# Patient Record
Sex: FEMALE
[De-identification: names, ages, dates, MRNs, and addresses within clinical notes are randomized per-mention and may not be internally consistent; named-entity substitution may affect disease eponyms.]

---

## 2018-02-05 ENCOUNTER — TRANSCRIPTION ENCOUNTER (OUTPATIENT)
Age: 28
End: 2018-02-05

## 2020-06-18 PROBLEM — Z00.00 ENCOUNTER FOR PREVENTIVE HEALTH EXAMINATION: Status: ACTIVE | Noted: 2020-06-18

## 2020-06-23 ENCOUNTER — APPOINTMENT (OUTPATIENT)
Dept: ORTHOPEDIC SURGERY | Facility: CLINIC | Age: 30
End: 2020-06-23
Payer: MEDICAID

## 2020-06-23 VITALS
HEIGHT: 64 IN | WEIGHT: 135 LBS | BODY MASS INDEX: 23.05 KG/M2 | SYSTOLIC BLOOD PRESSURE: 110 MMHG | OXYGEN SATURATION: 98 % | DIASTOLIC BLOOD PRESSURE: 60 MMHG | HEART RATE: 60 BPM

## 2020-06-23 DIAGNOSIS — Z78.9 OTHER SPECIFIED HEALTH STATUS: ICD-10-CM

## 2020-06-23 DIAGNOSIS — Z80.3 FAMILY HISTORY OF MALIGNANT NEOPLASM OF BREAST: ICD-10-CM

## 2020-06-23 PROCEDURE — 99204 OFFICE O/P NEW MOD 45 MIN: CPT

## 2020-06-23 NOTE — PHYSICAL EXAM
[de-identified] : General appearance: well nourished and hydrated, pleasant, alert and oriented x 3, cooperative.  \par HEENT: normocephalic, EOM intact, wearing mask, external auditory canal clear.  \par Cardiovascular: no lower leg edema, no varicosities, dorsalis pedis pulses palpable and symmetric.  \par Lymphatics: no palpable lymphadenopathy, no lymphedema.  \par Neurologic: sensation is normal, no muscle weakness in upper or lower extremities, patella tendon reflexes present and symmetric.  \par Dermatologic: skin moist, warm, no rash.  \par Spine: cervical spine with normal lordosis and painless range of motion, thoracic spine with normal kyphosis and painless range of motion, lumbosacral spine with normal lordosis and painless range of motion.\par Gait: hobbling with left knee semiflexed.\par \par Left knee:\par - Inspection: moderate effusion and anterior soft tissue swelling, negative ecchymosis and erythema.  \par - Wounds: healed abrasions at anterolateral aspect of patella; healed longitudinal scar across skin overlying patellar tendon\par - Alignment: normal.  \par - Palpation: tenderness on palpation of lateral joint line, patellar tendon, and fat pad.  \par - ROM active: 30-80, pain on extremes of motion\par - Ligamentous laxity: limited by stiffness and guarding. Grossly negative Lachman; unable to assess drawers; stable to varus stress, stable to valgus stress.  \par - Meniscal Test: painful Thomas and Jeniffer.  \par - Muscle Test: 5/5 quad strength.  \par \par Right knee:\par - Inspection: negative swelling, ecchymosis, and erythema.  \par - Wounds: none.  \par - Alignment: normal.  \par - Palpation: no specific tenderness on palpation.  \par - ROM active: 10 hyper - 150, no pain on extremes of motion\par - Ligamentous laxity: negative Lachman, negative ant. drawer test, negative post. drawer test, negative pivot shift test, stable to varus stress, stable to valgus stress.  \par - Meniscal Test: negative Thomas, negative Jeniffer.  \par - Popliteal angle: 80 degrees\par - Muscle Test: 5/5 quad strength. [de-identified] : Outside x-rays were reviewed, dated 5/30/20 (uploaded to [facility]): no fracture, dislocation, osteoarthritis, or osteonecrosis noted of left knee. Patella sits at appropriate height and tracks centrally.
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
## 2020-06-23 NOTE — REVIEW OF SYSTEMS
[Joint Swelling] : joint swelling [Joint Pain] : joint pain [Joint Stiffness] : joint stiffness [Negative] : Heme/Lymph

## 2020-06-29 ENCOUNTER — APPOINTMENT (OUTPATIENT)
Dept: RADIOLOGY | Facility: CLINIC | Age: 30
End: 2020-06-29

## 2020-06-29 ENCOUNTER — OUTPATIENT (OUTPATIENT)
Dept: OUTPATIENT SERVICES | Facility: HOSPITAL | Age: 30
LOS: 1 days | End: 2020-06-29
Payer: COMMERCIAL

## 2020-06-29 ENCOUNTER — RESULT REVIEW (OUTPATIENT)
Age: 30
End: 2020-06-29

## 2020-06-29 ENCOUNTER — APPOINTMENT (OUTPATIENT)
Dept: ORTHOPEDIC SURGERY | Facility: CLINIC | Age: 30
End: 2020-06-29
Payer: MEDICAID

## 2020-06-29 VITALS — BODY MASS INDEX: 23.05 KG/M2 | RESPIRATION RATE: 16 BRPM | WEIGHT: 135 LBS | HEIGHT: 64 IN

## 2020-06-29 PROCEDURE — 99214 OFFICE O/P EST MOD 30 MIN: CPT

## 2020-06-29 PROCEDURE — 72084 X-RAY EXAM ENTIRE SPI 6/> VW: CPT

## 2020-06-29 PROCEDURE — 72020 X-RAY EXAM OF SPINE 1 VIEW: CPT | Mod: 26

## 2020-07-01 ENCOUNTER — APPOINTMENT (OUTPATIENT)
Dept: NEUROLOGY | Facility: CLINIC | Age: 30
End: 2020-07-01
Payer: MEDICAID

## 2020-07-01 VITALS
BODY MASS INDEX: 22.71 KG/M2 | WEIGHT: 133 LBS | TEMPERATURE: 97.3 F | OXYGEN SATURATION: 97 % | HEART RATE: 86 BPM | SYSTOLIC BLOOD PRESSURE: 112 MMHG | HEIGHT: 64 IN | DIASTOLIC BLOOD PRESSURE: 70 MMHG

## 2020-07-01 PROCEDURE — 99243 OFF/OP CNSLTJ NEW/EST LOW 30: CPT

## 2020-07-02 LAB — CERULOPLASMIN SERPL-MCNC: 29 MG/DL

## 2020-07-08 LAB — COPPER SERPL-MCNC: 121 UG/DL

## 2020-07-14 ENCOUNTER — OUTPATIENT (OUTPATIENT)
Dept: OUTPATIENT SERVICES | Facility: HOSPITAL | Age: 30
LOS: 1 days | End: 2020-07-14

## 2020-07-14 ENCOUNTER — APPOINTMENT (OUTPATIENT)
Dept: MRI IMAGING | Facility: CLINIC | Age: 30
End: 2020-07-14
Payer: MEDICAID

## 2020-07-14 PROCEDURE — 70553 MRI BRAIN STEM W/O & W/DYE: CPT | Mod: 26

## 2020-07-27 ENCOUNTER — APPOINTMENT (OUTPATIENT)
Dept: ORTHOPEDIC SURGERY | Facility: CLINIC | Age: 30
End: 2020-07-27
Payer: MEDICAID

## 2020-07-27 VITALS
WEIGHT: 133 LBS | HEIGHT: 64 IN | SYSTOLIC BLOOD PRESSURE: 102 MMHG | BODY MASS INDEX: 22.71 KG/M2 | OXYGEN SATURATION: 96 % | HEART RATE: 88 BPM | DIASTOLIC BLOOD PRESSURE: 71 MMHG

## 2020-07-27 DIAGNOSIS — M62.838 OTHER MUSCLE SPASM: ICD-10-CM

## 2020-07-27 DIAGNOSIS — M24.562 CONTRACTURE, LEFT KNEE: ICD-10-CM

## 2020-07-27 PROCEDURE — 99213 OFFICE O/P EST LOW 20 MIN: CPT

## 2020-08-24 ENCOUNTER — APPOINTMENT (OUTPATIENT)
Dept: NEUROLOGY | Facility: CLINIC | Age: 30
End: 2020-08-24
Payer: MEDICAID

## 2020-08-24 VITALS
TEMPERATURE: 98.2 F | BODY MASS INDEX: 21.85 KG/M2 | OXYGEN SATURATION: 98 % | DIASTOLIC BLOOD PRESSURE: 80 MMHG | HEART RATE: 67 BPM | SYSTOLIC BLOOD PRESSURE: 115 MMHG | HEIGHT: 64 IN | WEIGHT: 128 LBS

## 2020-08-24 PROCEDURE — 99213 OFFICE O/P EST LOW 20 MIN: CPT

## 2020-08-24 RX ORDER — FLUTICASONE PROPIONATE 50 UG/1
50 SPRAY, METERED NASAL
Qty: 16 | Refills: 0 | Status: DISCONTINUED | COMMUNITY
Start: 2020-02-18 | End: 2020-08-24

## 2020-08-24 RX ORDER — PREDNISONE 20 MG/1
20 TABLET ORAL
Qty: 5 | Refills: 0 | Status: DISCONTINUED | COMMUNITY
Start: 2020-02-27 | End: 2020-08-24

## 2020-08-24 RX ORDER — AMOXICILLIN AND CLAVULANATE POTASSIUM 875; 125 MG/1; MG/1
875-125 TABLET, COATED ORAL
Qty: 14 | Refills: 0 | Status: DISCONTINUED | COMMUNITY
Start: 2020-02-18 | End: 2020-08-24

## 2020-08-24 NOTE — ASSESSMENT
[FreeTextEntry1] : There does not appear to be a direct neurologic cause of knee stiffness. She is diffusely hyperreflexic, but this is symmetric, and there is no clonus at the ankles. There is no weakness or sensory loss. \par \par The tremor since childhood is interesting and may be related in a way - perhaps hyperexcitability of central nervous system motor circuits may be causing some subconscious / involuntary stiffening of the leg after injury. \par Will test serum copper and ceruloplasmin (for Coy's disease) and check MRI Brain for that as well as other potential etiologies. \par If unremarkable, she may have a form of essential tremor (although no family history), or perhaps tremor related to anxiety (although would be unusual to be present since childhood through this age). \par \par There is no allodynia, warmth, erythema or edema to suggest CRPS. Thus there is no neurologic contraindication to knee surgery, regardless of the etiology of tremor.

## 2020-08-24 NOTE — ASSESSMENT
[FreeTextEntry1] : Left knee / leg stiffness\par Tremor consistent with essential tremor remains\par Discussed treatment options - will prescribe propranolol 20mg tabs to take 1-2 up to TID PRN \par return in 6 months, sooner if new or worsening symptoms

## 2020-08-24 NOTE — HISTORY OF PRESENT ILLNESS
[FreeTextEntry1] : She's been much better since last visit - knee stiffness has improved, pain has improved, and she is walking well without any assistive device\par She still has a tremor which interferes in her job as a . she is interested in medication to decrease the tremor

## 2020-08-24 NOTE — PHYSICAL EXAM
[FreeTextEntry1] : Gen: appears well, well-nourished, no acute distress\par \par MS: awake, alert, oriented, speech fluent, comprehension intact, good fund of knowledge, recent and remote memory intact, attention intact\par \par CN: PERRL, EOMI, visual fields full, facial strength and sensation intact and symmetric, palate elevation symmetric, tongue midline, no tongue atrophy or fasciculations\par \par Motor: normal bulk and tone, 5/5 strength throughout\par Irregular, high frequency, moderate amplitude tremor in arms / hands with arms outstretched or when holding a cup\par \par Sensory: light touch and pinprick intact and symmetric throughout; no allodynia\par \par Reflexes: 3+ symmetric throughout; no clonus\par \par Coordination: no dysmetria on finger to nose, Romberg negative\par \par Gait: left leg stiffness, does not fully extend left knee when she walks, not ataxic\par \par MSK: cannot passively extend left knee to 180 degrees, somewhat limited due to pain\par \par Skin/Extremities: no warmth or edema of left knee / leg\par \par CV: 2+ DP pulses\par \par Ophtho: fundi not visualized

## 2020-08-24 NOTE — HISTORY OF PRESENT ILLNESS
[FreeTextEntry1] : CC: tremor. leg stiffness\par \par HPI: 30 year old woman referred by Dr. Owusu for leg stiffness after knee injury 1 month ago \par She has trouble straightening her left knee, somewhat limited by pain, but outside of the pain she feels that there is a stiffness that limits extension \par Sometimes she gets tingling on the bottom of the left foot, but not very prominent\par She also has had a tremor in her hands since childhood (not sure which age)\par It's always present to some degree, although worse at some times than others.\par It's also worse when she is anxious or stressed; it sometimes affects her handwriting. \par \par Other Problem(s):\par \par Data reviewed:\par Imaging (reports): MRI left knee reviewed\par Prior records: Dr. Arnett and Dr. Owusu's notes reviewed\par \par ROS: 13 pt review of systems performed and reviewed with patient (General, Eyes, Ears, Cardiovascular, Respiratory, Gastrointestinal, Genitourinary, Musculoskeletal, Skin, Endocrine, Hematologic, Psychiatric, Neurologic)\par Past medical history, surgical history, social history, and family history reviewed with patient\par See scanned document for details

## 2020-08-24 NOTE — PHYSICAL EXAM
[FreeTextEntry1] : Motor: normal bulk and tone, 5/5 strength throughout\par Irregular, high frequency, moderate amplitude tremor in arms / hands with arms outstretched or when holding a cup\par Gait: normal\par Reflexes: mildly brisk 3+ symmetric throughout; no clonus

## 2020-08-26 ENCOUNTER — APPOINTMENT (OUTPATIENT)
Dept: ORTHOPEDIC SURGERY | Facility: CLINIC | Age: 30
End: 2020-08-26
Payer: MEDICAID

## 2020-08-26 VITALS
OXYGEN SATURATION: 99 % | DIASTOLIC BLOOD PRESSURE: 84 MMHG | WEIGHT: 130 LBS | HEART RATE: 68 BPM | BODY MASS INDEX: 22.31 KG/M2 | SYSTOLIC BLOOD PRESSURE: 116 MMHG

## 2020-08-26 PROCEDURE — 99213 OFFICE O/P EST LOW 20 MIN: CPT

## 2020-10-07 ENCOUNTER — APPOINTMENT (OUTPATIENT)
Dept: ORTHOPEDIC SURGERY | Facility: CLINIC | Age: 30
End: 2020-10-07
Payer: MEDICAID

## 2020-10-14 ENCOUNTER — APPOINTMENT (OUTPATIENT)
Dept: ORTHOPEDIC SURGERY | Facility: CLINIC | Age: 30
End: 2020-10-14
Payer: MEDICAID

## 2020-10-14 DIAGNOSIS — S83.512A SPRAIN OF ANTERIOR CRUCIATE LIGAMENT OF LEFT KNEE, INITIAL ENCOUNTER: ICD-10-CM

## 2020-10-14 DIAGNOSIS — S83.282A OTHER TEAR OF LATERAL MENISCUS, CURRENT INJURY, LEFT KNEE, INITIAL ENCOUNTER: ICD-10-CM

## 2020-10-14 PROCEDURE — 99213 OFFICE O/P EST LOW 20 MIN: CPT

## 2021-02-24 ENCOUNTER — APPOINTMENT (OUTPATIENT)
Dept: NEUROLOGY | Facility: CLINIC | Age: 31
End: 2021-02-24
Payer: MEDICAID

## 2021-02-24 VITALS
WEIGHT: 135 LBS | DIASTOLIC BLOOD PRESSURE: 67 MMHG | HEART RATE: 94 BPM | BODY MASS INDEX: 23.05 KG/M2 | SYSTOLIC BLOOD PRESSURE: 103 MMHG | HEIGHT: 64 IN | OXYGEN SATURATION: 96 %

## 2021-02-24 DIAGNOSIS — M23.92 UNSPECIFIED INTERNAL DERANGEMENT OF LEFT KNEE: ICD-10-CM

## 2021-02-24 DIAGNOSIS — R25.1 TREMOR, UNSPECIFIED: ICD-10-CM

## 2021-02-24 PROCEDURE — 99214 OFFICE O/P EST MOD 30 MIN: CPT

## 2021-02-24 PROCEDURE — 99072 ADDL SUPL MATRL&STAF TM PHE: CPT

## 2021-02-24 NOTE — PHYSICAL EXAM
[FreeTextEntry1] : Motor: normal bulk and tone, 5/5 strength throughout\par Irregular, high frequency, low amplitude tremor in arms / hands with arms outstretched or when holding a cup\par Gait: normal\par Reflexes: mildly brisk 3+ symmetric throughout; no clonus

## 2021-02-24 NOTE — HISTORY OF PRESENT ILLNESS
[FreeTextEntry1] : Tremor is stll there but better with propranolol- she takes 20mg as needed, usually before work \par Left knee still "clicks" when she walks but pain is much improved\par

## 2021-02-24 NOTE — ASSESSMENT
[FreeTextEntry1] : Continue propranolol 20mg up to TID PRN, if tremor worsens in the future dose can be increased\par Left knee improved - monitor\par Return as needed\par

## 2022-07-11 ENCOUNTER — RX RENEWAL (OUTPATIENT)
Age: 32
End: 2022-07-11

## 2024-05-08 RX ORDER — PROPRANOLOL HYDROCHLORIDE 20 MG/1
20 TABLET ORAL
Qty: 180 | Refills: 5 | Status: ACTIVE | COMMUNITY
Start: 2020-08-24 | End: 1900-01-01